# Patient Record
Sex: MALE | Race: WHITE | URBAN - METROPOLITAN AREA
[De-identification: names, ages, dates, MRNs, and addresses within clinical notes are randomized per-mention and may not be internally consistent; named-entity substitution may affect disease eponyms.]

---

## 2022-10-25 ENCOUNTER — EDUCATION (OUTPATIENT)
Dept: HEMATOLOGY/ONCOLOGY | Facility: CLINIC | Age: 57
End: 2022-10-25

## 2022-10-25 DIAGNOSIS — Z52.001 STEM CELL DONOR: Primary | ICD-10-CM

## 2022-10-25 NOTE — PROGRESS NOTES
"Spoke with Edd Carroll, potential donor for RECIPIENT name today via telephone. Discussed the fact that his sister(s) is in need of a stem cell transplant. Edd said that he was interested in possibly being the donor for his sister(s)'s transplant. Briefly discussed the donation process. Explained to Edd that if he would match his sister(s), that his commitment to donate is very important and that he has the right to change his mind. Also explained however, that a late decision not to donate can be life threatening to the patient. Explained that he should think seriously about his commitment before he has his tissue typing drawn, and if he feels uncomfortable moving forward, that it is okay for him to say "no." Also informed Edd that if he is a perfect match for his sister(s), that we will contact him and ask if he is willing to donate. If he agrees to proceed, we will ask him about his health and schedule a physician appointment and more testing. He will receive a physical examination to be sure it is safe for him to donate and that his donation will provide the best possible outcome for the patient. We will follow medical guidelines that protect his health as a potential donor as well as the health of the transplant patient. Edd was given the opportunity to ask questions.   Will mail HLA typing kit to patient    "

## 2022-11-25 ENCOUNTER — LAB VISIT (OUTPATIENT)
Dept: LAB | Facility: HOSPITAL | Age: 57
End: 2022-11-25
Payer: OTHER GOVERNMENT

## 2022-11-25 DIAGNOSIS — Z52.001 STEM CELL DONOR: ICD-10-CM

## 2022-11-25 PROCEDURE — 81373 HLA I TYPING 1 LOCUS LR: CPT | Mod: 59,PO | Performed by: INTERNAL MEDICINE

## 2022-11-25 PROCEDURE — 81376 HLA II TYPING 1 LOCUS LR: CPT | Mod: 59,PO | Performed by: INTERNAL MEDICINE

## 2022-11-25 PROCEDURE — 81373 HLA I TYPING 1 LOCUS LR: CPT | Mod: PO | Performed by: INTERNAL MEDICINE

## 2022-11-28 LAB — HLATY INTERPRETATION: NORMAL

## 2022-12-07 ENCOUNTER — TELEPHONE (OUTPATIENT)
Dept: HEMATOLOGY/ONCOLOGY | Facility: CLINIC | Age: 57
End: 2022-12-07
Payer: OTHER GOVERNMENT

## 2022-12-07 LAB
HLA DQA1 1: NORMAL
HLA DQA1 2: NORMAL
HLA DRB4 1: NORMAL
HLA REALTIMEPCR TYPING CLASSI&II INTERPRETATION: NORMAL
HLA-A 1 SERO. EQUIV: 1
HLA-A 1: NORMAL
HLA-A 2 SERO. EQUIV: 2
HLA-A 2: NORMAL
HLA-B 1 SERO. EQUIV: 8
HLA-B 1: NORMAL
HLA-B 2 SERO. EQUIV: 60
HLA-B 2: NORMAL
HLA-BW 1 SERO. EQUIV: 6
HLA-BW 2 SERO. EQUIV: NORMAL
HLA-C 1: NORMAL
HLA-C 2: NORMAL
HLA-CW 1 SERO. EQUIV: 10
HLA-CW 2 SERO. EQUIV: 7
HLA-DPA1 1: NORMAL
HLA-DPA1 2: NORMAL
HLA-DPB1 1: NORMAL
HLA-DPB1 2: NORMAL
HLA-DQ 1 SERO. EQUIV: 2
HLA-DQ 2 SERO. EQUIV: 6
HLA-DQB1 1: NORMAL
HLA-DQB1 2: NORMAL
HLA-DRB1 1 SERO. EQUIV: 17
HLA-DRB1 1: NORMAL
HLA-DRB1 2 SERO. EQUIV: 13
HLA-DRB1 2: NORMAL
HLA-DRB3 1: NORMAL
HLA-DRB3 2: NORMAL
HLA-DRB345 1 SERO. EQUIV: 52
HLA-DRB345 2 SERO. EQUIV: NORMAL
HLA-DRB4 2: NORMAL
HLA-DRB5 1: NORMAL
HLA-DRB5 2: NORMAL
RTPCR TESTING DATE: NORMAL